# Patient Record
Sex: FEMALE | Race: WHITE | Employment: FULL TIME | URBAN - METROPOLITAN AREA
[De-identification: names, ages, dates, MRNs, and addresses within clinical notes are randomized per-mention and may not be internally consistent; named-entity substitution may affect disease eponyms.]

---

## 2017-01-01 ENCOUNTER — TRANSFERRED RECORDS (OUTPATIENT)
Dept: HEALTH INFORMATION MANAGEMENT | Facility: CLINIC | Age: 22
End: 2017-01-01

## 2017-01-01 LAB — PAP SMEAR - HIM PATIENT REPORTED: NEGATIVE

## 2018-12-13 ENCOUNTER — OFFICE VISIT (OUTPATIENT)
Dept: FAMILY MEDICINE | Facility: CLINIC | Age: 23
End: 2018-12-13
Payer: COMMERCIAL

## 2018-12-13 VITALS
WEIGHT: 191.4 LBS | OXYGEN SATURATION: 98 % | DIASTOLIC BLOOD PRESSURE: 82 MMHG | SYSTOLIC BLOOD PRESSURE: 122 MMHG | TEMPERATURE: 98.6 F | HEART RATE: 80 BPM | BODY MASS INDEX: 28.35 KG/M2 | HEIGHT: 69 IN

## 2018-12-13 DIAGNOSIS — S39.92XS BACK INJURY, SEQUELA: ICD-10-CM

## 2018-12-13 DIAGNOSIS — Z11.4 SCREENING FOR HIV (HUMAN IMMUNODEFICIENCY VIRUS): ICD-10-CM

## 2018-12-13 DIAGNOSIS — Z23 NEED FOR PROPHYLACTIC VACCINATION AND INOCULATION AGAINST INFLUENZA: ICD-10-CM

## 2018-12-13 DIAGNOSIS — Z30.011 ENCOUNTER FOR INITIAL PRESCRIPTION OF CONTRACEPTIVE PILLS: ICD-10-CM

## 2018-12-13 DIAGNOSIS — Z11.3 SCREEN FOR STD (SEXUALLY TRANSMITTED DISEASE): ICD-10-CM

## 2018-12-13 DIAGNOSIS — Z00.01 ENCOUNTER FOR ROUTINE ADULT MEDICAL EXAM WITH ABNORMAL FINDINGS: Primary | ICD-10-CM

## 2018-12-13 DIAGNOSIS — Z12.4 SCREENING FOR MALIGNANT NEOPLASM OF CERVIX: ICD-10-CM

## 2018-12-13 LAB
ERYTHROCYTE [DISTWIDTH] IN BLOOD BY AUTOMATED COUNT: 12.2 % (ref 10–15)
HCT VFR BLD AUTO: 39.3 % (ref 35–47)
HGB BLD-MCNC: 12.9 G/DL (ref 11.7–15.7)
HIV 1+2 AB+HIV1 P24 AG SERPL QL IA: NONREACTIVE
MCH RBC QN AUTO: 30.2 PG (ref 26.5–33)
MCHC RBC AUTO-ENTMCNC: 32.8 G/DL (ref 31.5–36.5)
MCV RBC AUTO: 92 FL (ref 78–100)
PLATELET # BLD AUTO: 315 10E9/L (ref 150–450)
RBC # BLD AUTO: 4.27 10E12/L (ref 3.8–5.2)
WBC # BLD AUTO: 7.1 10E9/L (ref 4–11)

## 2018-12-13 PROCEDURE — 87389 HIV-1 AG W/HIV-1&-2 AB AG IA: CPT | Performed by: FAMILY MEDICINE

## 2018-12-13 PROCEDURE — 87591 N.GONORRHOEAE DNA AMP PROB: CPT | Performed by: FAMILY MEDICINE

## 2018-12-13 PROCEDURE — 87491 CHLMYD TRACH DNA AMP PROBE: CPT | Performed by: FAMILY MEDICINE

## 2018-12-13 PROCEDURE — 80061 LIPID PANEL: CPT | Performed by: FAMILY MEDICINE

## 2018-12-13 PROCEDURE — 99385 PREV VISIT NEW AGE 18-39: CPT | Performed by: FAMILY MEDICINE

## 2018-12-13 PROCEDURE — 85027 COMPLETE CBC AUTOMATED: CPT | Performed by: FAMILY MEDICINE

## 2018-12-13 PROCEDURE — 36415 COLL VENOUS BLD VENIPUNCTURE: CPT | Performed by: FAMILY MEDICINE

## 2018-12-13 RX ORDER — CYCLOBENZAPRINE HCL 5 MG
5 TABLET ORAL
COMMUNITY
End: 2020-03-13

## 2018-12-13 RX ORDER — ETHYNODIOL DIACETATE AND ETHINYL ESTRADIOL 1 MG-35MCG
1 KIT ORAL DAILY
Qty: 28 TABLET | Refills: 12 | Status: SHIPPED | OUTPATIENT
Start: 2018-12-13 | End: 2019-04-01

## 2018-12-13 SDOH — HEALTH STABILITY: MENTAL HEALTH: HOW MANY STANDARD DRINKS CONTAINING ALCOHOL DO YOU HAVE ON A TYPICAL DAY?: 3 OR 4

## 2018-12-13 SDOH — HEALTH STABILITY: PHYSICAL HEALTH: ON AVERAGE, HOW MANY MINUTES DO YOU ENGAGE IN EXERCISE AT THIS LEVEL?: 30 MIN

## 2018-12-13 SDOH — HEALTH STABILITY: MENTAL HEALTH: HOW OFTEN DO YOU HAVE 6 OR MORE DRINKS ON ONE OCCASION?: NEVER

## 2018-12-13 SDOH — HEALTH STABILITY: MENTAL HEALTH: HOW OFTEN DO YOU HAVE A DRINK CONTAINING ALCOHOL?: 2-4 TIMES A MONTH

## 2018-12-13 SDOH — HEALTH STABILITY: PHYSICAL HEALTH: ON AVERAGE, HOW MANY DAYS PER WEEK DO YOU ENGAGE IN MODERATE TO STRENUOUS EXERCISE (LIKE A BRISK WALK)?: 5 DAYS

## 2018-12-13 ASSESSMENT — ANXIETY QUESTIONNAIRES
7. FEELING AFRAID AS IF SOMETHING AWFUL MIGHT HAPPEN: NOT AT ALL
6. BECOMING EASILY ANNOYED OR IRRITABLE: NOT AT ALL
IF YOU CHECKED OFF ANY PROBLEMS ON THIS QUESTIONNAIRE, HOW DIFFICULT HAVE THESE PROBLEMS MADE IT FOR YOU TO DO YOUR WORK, TAKE CARE OF THINGS AT HOME, OR GET ALONG WITH OTHER PEOPLE: NOT DIFFICULT AT ALL
1. FEELING NERVOUS, ANXIOUS, OR ON EDGE: NOT AT ALL
2. NOT BEING ABLE TO STOP OR CONTROL WORRYING: NOT AT ALL
GAD7 TOTAL SCORE: 0
3. WORRYING TOO MUCH ABOUT DIFFERENT THINGS: NOT AT ALL
5. BEING SO RESTLESS THAT IT IS HARD TO SIT STILL: NOT AT ALL

## 2018-12-13 ASSESSMENT — PATIENT HEALTH QUESTIONNAIRE - PHQ9
5. POOR APPETITE OR OVEREATING: NOT AT ALL
SUM OF ALL RESPONSES TO PHQ QUESTIONS 1-9: 0

## 2018-12-13 ASSESSMENT — MIFFLIN-ST. JEOR: SCORE: 1673.59

## 2018-12-13 NOTE — LETTER
[address] [address]  Phone: [phone]  Fax: [phone]  December 13, 2018     AUTHORIZATION TO RELEASE PROTECTED HEALTH INFORMATION    Patient Name:  Patricia Heredia  YOB: 1995    Benjamin MRN:[id number]             This will authorize [facility]  to request information from :     [facility]  F [phone]  P [phone]    The following information is to be released for health maintenance and continuing care purposes with my primary care clinic:                 Immunization Report                             Visit Notes     Lab results    -I understand that I may revoke this authorization by written request at any time to the address listed at the top of this form.  I understand that the revocation will not apply to information that has already been released in response to this authorization.    -This authorization last for one year after the date you sign it.     -[place] cannot prevent redisclosure of the information by the person or organization who receives your records under this authorization, and that information may not be covered by state and federal privacy protections after it is released. By signing this authorization, you release [place] from any and all liability resulting from a redisclosure by the recipient.    ___________________________________          _____________  Signature of Patient/Authorized Person                     Date        ____________________________________________  (Reason if patient is unable to sign)

## 2018-12-13 NOTE — LETTER
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           Sincerely,             Alva Orona MD

## 2018-12-13 NOTE — PROGRESS NOTES
SUBJECTIVE:   CC: Patricia Heredia is an 23 year old woman who presents for preventive health visit.     Patient transferring care to us at [place] from [facility] in [place], [place] from 2015-Feb 2018, last physical was November 2017.  Prior to 2015, she lived in [place] and was seen at [facility] in [place], [place] and her primary provider was Dr. Lucille Sexton. Patient requested records from [facility].  Faxed over a request for records from [facility] today.    She was prescribed a birth control pill that was prescribed by Dr. Lucille Sexton that really liked, she had been on it for a couple years.  She then transferred care to [facility] and was prescribed Tri-estarylla which made her really deleon and she had bad cramps. She is currently not on the on the pill but would like to be on one.      Healthy Habits:    Do you get at least three servings of calcium containing foods daily (dairy, green leafy vegetables, etc.)? yes    Amount of exercise or daily activities, outside of work: 3 day(s) per week    Problems taking medications regularly No    Medication side effects: No    Have you had an eye exam in the past two years? yes    Do you see a dentist twice per year? yes    Do you have sleep apnea, excessive snoring or daytime drowsiness?no      Today's PHQ-2 Score: No flowsheet data found.    Abuse: Current or Past(Physical, Sexual or Emotional)- No  Do you feel safe in your environment? Yes      Social History     Tobacco Use     Smoking status: Never Smoker     Smokeless tobacco: Never Used   Substance Use Topics     Alcohol use: Yes     Frequency: 2-4 times a month     Drinks per session: 3 or 4     Binge frequency: Never     Comment: 0-5 per week.      If you drink alcohol do you typically have >3 drinks per day or >7 drinks per week? No                     Reviewed orders with patient.  Reviewed health maintenance and updated orders accordingly - Yes  BP  Readings from Last 3 Encounters:   12/13/18 122/82    Wt Readings from Last 3 Encounters:   12/13/18 86.8 kg (191 lb 6.4 oz)                  Patient Active Problem List   Diagnosis     Contraceptive management     Past Surgical History:   Procedure Laterality Date     ADENOIDECTOMY Bilateral     in first grade - secondary to recurrent otitis media - went away after that     wisdom teeth         Social History     Tobacco Use     Smoking status: Never Smoker     Smokeless tobacco: Never Used   Substance Use Topics     Alcohol use: Yes     Frequency: 2-4 times a month     Drinks per session: 3 or 4     Binge frequency: Never     Comment: 0-5 per week.      Family History   Problem Relation Age of Onset     Diabetes Mother         type 2      Hyperlipidemia Mother      Hypertension Father      Parkinsonism Maternal Grandfather          Current Outpatient Medications   Medication Sig Dispense Refill     cyclobenzaprine (FLEXERIL) 5 MG tablet Take 5 mg by mouth nightly as needed for muscle spasms (Back spasms)        Allergies   Allergen Reactions     Seasonal Allergies      No lab results found.     Mammogram not appropriate for this patient based on age.    No results found.      History of abnormal Pap smear: NO - age 21-29 PAP every 3 years recommended        Reviewed and updated as needed this visit by clinical staff  Tobacco  Allergies  Meds  Med Hx  Surg Hx  Fam Hx  Soc Hx      Reviewed and updated as needed this visit by Provider  Tobacco  Med Hx  Fam Hx  Soc Hx       Past Medical History:   Diagnosis Date     Back injury, sequela     from diving in volleyball during HS - better with 3x/week stretching with boxing      Contraceptive management     on tri-estaryla - got bad mood swings/anger issues.       Past Surgical History:   Procedure Laterality Date     ADENOIDECTOMY Bilateral     in first grade - secondary to recurrent otitis media - went away after that     wisdom teeth       Obstetric History     "   T0      L0     SAB0   TAB0   Ectopic0   Multiple0   Live Births0           ROS:  CONSTITUTIONAL: NEGATIVE for fever, chills, change in weight  INTEGUMENTARU/SKIN: NEGATIVE for worrisome rashes, moles or lesions  EYES: NEGATIVE for vision changes or irritation  ENT: NEGATIVE for ear, mouth and throat problems  RESP: NEGATIVE for significant cough or SOB  BREAST: NEGATIVE for masses, tenderness or discharge  CV: NEGATIVE for chest pain, palpitations or peripheral edema  GI: NEGATIVE for nausea, abdominal pain, heartburn, or change in bowel habits  : NEGATIVE for unusual urinary or vaginal symptoms. Periods are regular.  MUSCULOSKELETAL: NEGATIVE for significant arthralgias or myalgia  NEURO: NEGATIVE for weakness, dizziness or paresthesias  ENDOCRINE: NEGATIVE for temperature intolerance, skin/hair changes  HEME/ALLERGY/IMMUNE: NEGATIVE for bleeding problems  PSYCHIATRIC: NEGATIVE for changes in mood or affect    OBJECTIVE:   /82 (BP Location: Right arm, Patient Position: Chair, Cuff Size: Adult Regular)   Pulse 80   Temp 98.6  F (37  C) (Oral)   Ht 1.746 m (5' 8.75\")   Wt 86.8 kg (191 lb 6.4 oz)   LMP 2018 (Exact Date)   SpO2 98%   Breastfeeding? No   BMI 28.47 kg/m    EXAM:  GENERAL: healthy, alert and no distress  EYES: Eyes grossly normal to inspection, PERRL and conjunctivae and sclerae normal  HENT: ear canals and TM's normal, nose and mouth without ulcers or lesions  NECK: no adenopathy, no asymmetry, masses, or scars and thyroid normal to palpation  RESP: lungs clear to auscultation - no rales, rhonchi or wheezes  BREAST: normal without masses, tenderness or nipple discharge and no palpable axillary masses or adenopathy  CV: regular rate and rhythm, normal S1 S2, no S3 or S4, no murmur, click or rub, no peripheral edema and peripheral pulses strong  ABDOMEN: soft, nontender, no hepatosplenomegaly, no masses and bowel sounds normal   (female): normal female external " "genitalia, normal urethral meatus, vaginal mucosa pink, moist, well rugated, and normal cervix/adnexa/uterus without masses or discharge  MS: no gross musculoskeletal defects noted, no edema  SKIN: no suspicious lesions or rashes  NEURO: Normal strength and tone, mentation intact and speech normal  PSYCH: mentation appears normal, affect normal/bright    Diagnostic Test Results:  See [facility] orders.     ASSESSMENT/PLAN:       ICD-10-CM    1. Encounter for routine adult medical exam with abnormal findings Z00.01 Lipid panel reflex to direct LDL Non-fasting     CBC with platelets   2. Screening for malignant neoplasm of cervix Z12.4    3. Screening for HIV (human immunodeficiency virus) Z11.4 HIV Screening   4. Need for prophylactic vaccination and inoculation against influenza Z23    5. Encounter for initial prescription of contraceptive pills Z30.011 ethynodiol-ethinyl estradiol (KELNOR) 1-35 MG-MCG tablet   6. Back injury, sequela S39.92XS      See letters section for stand up/sit down desk.     COUNSELING:   Reviewed preventive health counseling, as reflected in patient instructions    BP Readings from Last 1 Encounters:   12/13/18 122/82     Estimated body mass index is 28.47 kg/m  as calculated from the following:    Height as of this encounter: 1.746 m (5' 8.75\").    Weight as of this encounter: 86.8 kg (191 lb 6.4 oz).      Weight management plan: Discussed healthy diet and exercise guidelines   Established an exercise regimen with the patient. Activity goal: 45 minutes 5 days a week. New exercise routine: cardiovascular workout on exercise equipment, walking and weightlifting. Diet regimen was discussed and plan is self-directed dieting: reduce calories, reduce portions, reduce processed  carbs, increase fruits/vegetables and avoid sweets and supervised diet program. Avoid artificial sweeteners and \"diet\" drinks and sodas.       reports that  has never smoked. she has never used smokeless " tobacco.      Counseling Resources:  ATP IV Guidelines  Pooled Cohorts Equation Calculator  Breast Cancer Risk Calculator  FRAX Risk Assessment  ICSI Preventive Guidelines  Dietary Guidelines for Americans, 2010  USDA's MyPlate  ASA Prophylaxis  Lung CA Screening    Alva Orona MD  [facility]

## 2018-12-13 NOTE — PATIENT INSTRUCTIONS
Preventive Health Recommendations  Female Ages 21 to 25     Yearly exam:     See your health care provider every year in order to  o Review health changes.   o Discuss preventive care.    o Review your medicines if your doctor has prescribed any.      You should be tested each year for STDs (sexually transmitted diseases).       Talk to your provider about how often you should have cholesterol testing.      Get a Pap test every three years. If you have an abnormal result, your doctor may have you test more often.      If you are at risk for diabetes, you should have a diabetes test (fasting glucose).     Shots:     Get a flu shot each year.     Get a tetanus shot every 10 years.     Consider getting the shot (vaccine) that prevents cervical cancer (Gardasil).    Nutrition:     Eat at least 5 servings of fruits and vegetables each day.    Eat whole-grain bread, whole-wheat pasta and brown rice instead of white grains and rice.    Get adequate Calcium and Vitamin D.     Lifestyle    Exercise at least 150 minutes a week each week (30 minutes a day, 5 days a week). This will help you control your weight and prevent disease.    Limit alcohol to one drink per day.    No smoking.     Wear sunscreen to prevent skin cancer.    See your dentist every six months for an exam and cleaning.               Thank you for choosing [facility]  for your Health Care. It was a pleasure seeing you at your visit today. Please contact us with any questions or concerns you may have.                   Alva Orona MD                                  To reach your [facility] care team after hours call:   [phone]    Our clinic hours are:     Monday- 7:30 am - 7:00 pm                             Tuesday through Friday- 7:30 am - 5:00 pm                                        Saturday- 8:00 am - 12:00 pm                  Phone:  [phone]    Our pharmacy hours are:     Monday  8:00 am to  7:00 pm      Tuesday through Friday 8:00am to 6:00pm                        Saturday - 9:00 am to 1:00 pm      Sunday : Closed.              Phone:  [phone]      There is also information available at our web site:  [email or web address]    If your provider ordered any lab tests and you do not receive the results within 10 business days, please call the clinic.    If you need a medication refill please contact your pharmacy.  Please allow 2 business days for your refill to be completed.    Our clinic offers telephone visits and e visits.  Please ask one of your team members to explain more.      Use [employer] (secure email communication and access to your chart) to send your primary care provider a message or make an appointment. Ask someone on your Team how to sign up for [employer].

## 2018-12-13 NOTE — Clinical Note
Please abstract the following data from this visit with this patient into the appropriate field in Epic:Pap smear done on this date: 2017 (approximately), by this group: [facility], results were normal.

## 2018-12-13 NOTE — LETTER
[facility]  [address] [place] [address]                  [phone]   December 17, 2018    Patricia Heredia  [address] [facility] [address]      Dear Patricia,    Here is a summary of your recent test results:    -All of your labs are normal.   HIV screening test done per current CDC guidelines = negative.    lipids, CBC with hemoglobin, white blood cells , and platelets all normal     Gonorrhea and chlamydia testing was negative.     Your test results are enclosed.      Please contact me if you have any questions.             Thank you very much for trusting [facility]..     Healthy regards,       Alva Orona M.D.          Results for orders placed or performed in visit on 12/13/18   HIV Screening   Result Value Ref Range    HIV Antigen Antibody Combo Nonreactive NR^Nonreactive       Lipid panel reflex to direct LDL Non-fasting   Result Value Ref Range    Cholesterol 160 <200 mg/dL    Triglycerides 53 <150 mg/dL    HDL Cholesterol 62 >49 mg/dL    LDL Cholesterol Calculated 87 <100 mg/dL    Non HDL Cholesterol 98 <130 mg/dL   CBC with platelets   Result Value Ref Range    WBC 7.1 4.0 - 11.0 10e9/L    RBC Count 4.27 3.8 - 5.2 10e12/L    Hemoglobin 12.9 11.7 - 15.7 g/dL    Hematocrit 39.3 35.0 - 47.0 %    MCV 92 78 - 100 fl    MCH 30.2 26.5 - 33.0 pg    MCHC 32.8 31.5 - 36.5 g/dL    RDW 12.2 10.0 - 15.0 %    Platelet Count 315 150 - 450 10e9/L   NEISSERIA GONORRHOEA PCR   Result Value Ref Range    Specimen Descrip Cervical     N Gonorrhea PCR Negative NEG^Negative   CHLAMYDIA TRACHOMATIS PCR   Result Value Ref Range    Specimen Description Cervical     Chlamydia Trachomatis PCR Negative NEG^Negative

## 2018-12-14 LAB
C TRACH DNA SPEC QL NAA+PROBE: NEGATIVE
CHOLEST SERPL-MCNC: 160 MG/DL
HDLC SERPL-MCNC: 62 MG/DL
LDLC SERPL CALC-MCNC: 87 MG/DL
N GONORRHOEA DNA SPEC QL NAA+PROBE: NEGATIVE
NONHDLC SERPL-MCNC: 98 MG/DL
SPECIMEN SOURCE: NORMAL
SPECIMEN SOURCE: NORMAL
TRIGL SERPL-MCNC: 53 MG/DL

## 2018-12-14 ASSESSMENT — ANXIETY QUESTIONNAIRES: GAD7 TOTAL SCORE: 0

## 2019-04-01 DIAGNOSIS — Z30.011 ENCOUNTER FOR INITIAL PRESCRIPTION OF CONTRACEPTIVE PILLS: ICD-10-CM

## 2019-04-01 RX ORDER — ETHYNODIOL DIACETATE AND ETHINYL ESTRADIOL 1 MG-35MCG
1 KIT ORAL DAILY
Qty: 28 TABLET | Refills: 8 | Status: SHIPPED | OUTPATIENT
Start: 2019-04-01 | End: 2020-03-13

## 2019-04-01 NOTE — TELEPHONE ENCOUNTER
"Requested Prescriptions   Pending Prescriptions Disp Refills     ethynodiol-ethinyl estradiol (KELNOR) 1-35 MG-MCG tablet 28 tablet 12        Last Written Prescription Date:  12/13/2018  Last Fill Quantity: 28,  # refills: 12   Last office visit: 12/13/2018 with prescribing provider:     Future Office Visit:   Next 5 appointments (look out 90 days)    Apr 08, 2019  9:30 AM CDT  Nurse Only with RV MA/LPN  [facility] [address] [place] [address]  [phone]   Apr 10, 2019  9:00 AM CDT  Nurse Only with RV ANTICOAGULATION CLINIC  [facility]) [address] [place] [id number]  [phone]            Sig: Take 1 tablet by mouth daily    Contraceptives Protocol Passed - 4/1/2019 10:57 AM       Passed - Patient is not a current smoker if age is 35 or older       Passed - Recent (12 mo) or future (30 days) visit within the authorizing provider's specialty    Patient had office visit in the last 12 months or has a visit in the next 30 days with authorizing provider or within the authorizing provider's specialty.  See \"Patient Info\" tab in inbasket, or \"Choose Columns\" in Meds & Orders section of the refill encounter.             Passed - Medication is active on med list       Passed - No active pregnancy on record       Passed - No positive pregnancy test in past 12 months        "

## 2019-04-01 NOTE — TELEPHONE ENCOUNTER
Reason for Call:  Medication or medication refill:    Do you use a [facility]?  Name of the pharmacy and phone number for the current request[facility]   Ph: [phone]  Fax: [phone]    Name of the medication requested: ethynodiol-ethinyl estradiol (KELNOR) 1-35 MG-MCG tablet    Other request: Advised of 72 business hour policy.    Can we leave a detailed message on this number? YES    Phone number patient can be reached at: Cell number on file:    Telephone Information:   Mobile [phone]       Best Time: anytime    Call taken on 4/1/2019 at 10:41 AM by Jaimie ASKEW

## 2019-04-01 NOTE — TELEPHONE ENCOUNTER
Prescription approved per [facility] Refill Protocol.  Pharmacy change.    Ines Amador, BS, RN, N  [facility]) [phone]

## 2019-10-09 ENCOUNTER — TRANSFERRED RECORDS (OUTPATIENT)
Dept: HEALTH INFORMATION MANAGEMENT | Facility: CLINIC | Age: 24
End: 2019-10-09

## 2020-01-22 ENCOUNTER — ANCILLARY PROCEDURE (OUTPATIENT)
Dept: GENERAL RADIOLOGY | Facility: CLINIC | Age: 25
End: 2020-01-22
Attending: PHYSICIAN ASSISTANT
Payer: COMMERCIAL

## 2020-01-22 ENCOUNTER — OFFICE VISIT (OUTPATIENT)
Dept: URGENT CARE | Facility: URGENT CARE | Age: 25
End: 2020-01-22
Payer: COMMERCIAL

## 2020-01-22 VITALS
DIASTOLIC BLOOD PRESSURE: 64 MMHG | WEIGHT: 195 LBS | HEART RATE: 78 BPM | HEIGHT: 68 IN | OXYGEN SATURATION: 99 % | TEMPERATURE: 98.1 F | BODY MASS INDEX: 29.55 KG/M2 | SYSTOLIC BLOOD PRESSURE: 116 MMHG | RESPIRATION RATE: 14 BRPM

## 2020-01-22 DIAGNOSIS — S99.911A ANKLE INJURY, RIGHT, INITIAL ENCOUNTER: ICD-10-CM

## 2020-01-22 DIAGNOSIS — S86.011A STRAIN OF RIGHT ACHILLES TENDON, INITIAL ENCOUNTER: Primary | ICD-10-CM

## 2020-01-22 PROCEDURE — 99214 OFFICE O/P EST MOD 30 MIN: CPT | Performed by: PHYSICIAN ASSISTANT

## 2020-01-22 PROCEDURE — 73610 X-RAY EXAM OF ANKLE: CPT | Mod: RT

## 2020-01-22 RX ORDER — CYCLOBENZAPRINE HCL 5 MG
5 TABLET ORAL
COMMUNITY
Start: 2018-02-23 | End: 2020-03-13

## 2020-01-22 RX ORDER — NORGESTIMATE AND ETHINYL ESTRADIOL 7DAYSX3 28
1 KIT ORAL
COMMUNITY
Start: 2017-11-08 | End: 2021-02-17

## 2020-01-22 RX ORDER — IBUPROFEN 200 MG
600 TABLET ORAL EVERY 4 HOURS PRN
COMMUNITY
End: 2021-02-17

## 2020-01-22 ASSESSMENT — MIFFLIN-ST. JEOR: SCORE: 1673.01

## 2020-01-22 ASSESSMENT — PAIN SCALES - GENERAL: PAINLEVEL: MODERATE PAIN (5)

## 2020-01-22 ASSESSMENT — ENCOUNTER SYMPTOMS: WOUND: 0

## 2020-01-22 NOTE — PATIENT INSTRUCTIONS
"  Patient Education     Achilles Tendon injury  Your Achilles tendon is a large band of tissue in the back of your ankle. It connects your calf muscles to your heel bone. The tendon helps you point your foot downward, rise on your toes, and push off when you walk. You use it almost every time you move your leg. But repeated stress can make the tendon more prone to injury. It may become inflamed and develop small tears (tendonitis). A complete tear through the tendon is known as an Achilles tendon rupture.    When to go to the emergency room (ER)  Seek medical care right away if you:    Hear a loud \"pop\" or snapping sound    Have the sensation of being hit hard in the back of the leg    Aren`t able to stand on your toes, bend your foot downward, or walk normally  A ruptured Achilles tendon may NOT cause pain. If you hear a loud pop, even if you can walk and don`t have pain, seek medical attention.  What to expect in the ER  A healthcare provider will examine your feet, ankles, and legs. He or she is likely to ask about your physical activities. Your healthcare provider may order tests, which could include a magnetic resonance imaging test (MRI). This test uses magnets to create a clear picture of the tendons. An ultrasound may also be done. Ultrasound uses sound waves to create an image of the injured area.  Your treatment  If your tendon has ruptured, you will be referred to an orthopaedic surgeon or a primary care sports healthcare provider. He or she will discuss your treatment choices with you. These include surgery to repair the tendon or wearing a cast or boot and using crutches until the tendon heals.  Date Last Reviewed: 5/1/2018 2000-2019 [employer]. [address] [place] [address], [place] [address]. All rights reserved. This information is not intended as a substitute for professional medical care. Always follow your healthcare professional's instructions.           "

## 2020-01-22 NOTE — PROGRESS NOTES
SUBJECTIVE:   Patricia Heredia is a 24 year old female presenting with a chief complaint of   Chief Complaint   Patient presents with     Urgent Care     Ankle Trauma     Rolled right ankle while playing volleyball, pain is in back of ankle/leg, no swelling in ankle besides the back, can not put weight on it x last pm       She is an established patient of [facility].    MS Injury/Pain    Onset of symptoms was 1 day(s) ago.  Location: right posterior ankle  Context:       The injury happened while playing volleyball      Mechanism: sports related injury      Patient experienced immediate pain  Course of symptoms is same.    Severity moderate  Current and Associated symptoms: Pain and Swelling, decreased range of motion  Denies  Warmth and Redness  Aggravating Factors: walking and weight-bearing  Therapies to improve symptoms include: ice and ibuprofen  This is the first time this type of problem has occurred for this patient.       Review of Systems   Musculoskeletal:        Right ankle pain   Skin: Negative for wound.       Past Medical History:   Diagnosis Date     Back injury, sequela     from diving in volleyball during HS - better with 3x/week stretching with boxing      Contraceptive management     on tri-estaryla - got bad mood swings/anger issues.      Family History   Problem Relation Age of Onset     Diabetes Mother         type 2      Hyperlipidemia Mother      Hypertension Father      Parkinsonism Maternal Grandfather      Current Outpatient Medications   Medication Sig Dispense Refill     ethynodiol-ethinyl estradiol (KELNOR) 1-35 MG-MCG tablet Take 1 tablet by mouth daily 28 tablet 8     ibuprofen (ADVIL/MOTRIN) 200 MG tablet Take 600 mg by mouth every 4 hours as needed for mild pain       order for DME Walking boot 1 Device 0     cyclobenzaprine (FLEXERIL) 5 MG tablet Take 5 mg by mouth       cyclobenzaprine (FLEXERIL) 5 MG tablet Take 5 mg by mouth nightly as needed for muscle spasms (Back spasms)         "norgestim-eth estrad triphasic (TRI-ESTARYLLA) 0.18/0.215/0.25 MG-35 MCG tablet Take 1 tablet by mouth       Social History     Tobacco Use     Smoking status: Never Smoker     Smokeless tobacco: Never Used   Substance Use Topics     Alcohol use: Yes     Frequency: 2-4 times a month     Drinks per session: 3 or 4     Binge frequency: Never     Comment: 0-5 per week.        OBJECTIVE  /64 (BP Location: Right arm, Patient Position: Sitting, Cuff Size: Adult Regular)   Pulse 78   Temp 98.1  F (36.7  C) (Oral)   Resp 14   Ht 1.727 m (5' 8\")   Wt 88.5 kg (195 lb)   LMP  (LMP Unknown)   SpO2 99%   Breastfeeding No   BMI 29.65 kg/m      Physical Exam  Constitutional:       General: She is not in acute distress.     Appearance: She is well-developed.   HENT:      Head: Normocephalic and atraumatic.      Right Ear: External ear normal.      Left Ear: External ear normal.   Eyes:      Conjunctiva/sclera: Conjunctivae normal.   Neck:      Musculoskeletal: Normal range of motion.   Pulmonary:      Effort: Pulmonary effort is normal. No respiratory distress.   Musculoskeletal:         General: Swelling and tenderness present.      Comments: Right ankle exam: tenderness to palpation right achilles tendon, mild to moderate swelling noted. ROM is limited due to pain. Plantar flexion and dorsiflexion mildly limited due to pain, but patient still able to perform.   Skin:     General: Skin is warm and dry.   Neurological:      Mental Status: She is alert.         Labs:  No results found for this or any previous visit (from the past 24 hour(s)).    X-Ray was done, my findings are: Negative for acute fractures or dislocation    ASSESSMENT:      ICD-10-CM    1. Strain of right Achilles tendon, initial encounter S86.011A order for DME   2. Ankle injury, right, initial encounter S99.911A XR Ankle Right G/E 3 Views     order for DME          PLAN:    Right ankle injury: X-ray today negative for acute fracture or dislocation. " "Discussed Achilles tendon strain. No evidence of complete Achilles' tendon rupture on exam.  Patient is provided with a walking boot.  Recommended weightbearing as tolerated.  Will anticipate gradual improvement in symptoms in the next 4 to 6 weeks.  Advised can take Tylenol or Motrin as needed for pain.  Follow-up if any worsening symptoms.  Patient agrees with the plan.    Followup:    If not improving or if condition worsens, follow up with your Primary Care Provider    Patient Instructions     Patient Education     Achilles Tendon injury  Your Achilles tendon is a large band of tissue in the back of your ankle. It connects your calf muscles to your heel bone. The tendon helps you point your foot downward, rise on your toes, and push off when you walk. You use it almost every time you move your leg. But repeated stress can make the tendon more prone to injury. It may become inflamed and develop small tears (tendonitis). A complete tear through the tendon is known as an Achilles tendon rupture.    When to go to the emergency room (ER)  Seek medical care right away if you:    Hear a loud \"pop\" or snapping sound    Have the sensation of being hit hard in the back of the leg    Aren`t able to stand on your toes, bend your foot downward, or walk normally  A ruptured Achilles tendon may NOT cause pain. If you hear a loud pop, even if you can walk and don`t have pain, seek medical attention.  What to expect in the ER  A healthcare provider will examine your feet, ankles, and legs. He or she is likely to ask about your physical activities. Your healthcare provider may order tests, which could include a magnetic resonance imaging test (MRI). This test uses magnets to create a clear picture of the tendons. An ultrasound may also be done. Ultrasound uses sound waves to create an image of the injured area.  Your treatment  If your tendon has ruptured, you will be referred to an orthopaedic surgeon or a primary care sports " healthcare provider. He or she will discuss your treatment choices with you. These include surgery to repair the tendon or wearing a cast or boot and using crutches until the tendon heals.  Date Last Reviewed: 5/1/2018 2000-2019 [employer]. [address] [place] [address], [place] [address]. All rights reserved. This information is not intended as a substitute for professional medical care. Always follow your healthcare professional's instructions.

## 2020-03-11 ENCOUNTER — HEALTH MAINTENANCE LETTER (OUTPATIENT)
Age: 25
End: 2020-03-11

## 2020-03-13 ENCOUNTER — OFFICE VISIT (OUTPATIENT)
Dept: FAMILY MEDICINE | Facility: CLINIC | Age: 25
End: 2020-03-13
Payer: COMMERCIAL

## 2020-03-13 VITALS
BODY MASS INDEX: 29.1 KG/M2 | WEIGHT: 192 LBS | OXYGEN SATURATION: 98 % | HEIGHT: 68 IN | HEART RATE: 115 BPM | DIASTOLIC BLOOD PRESSURE: 78 MMHG | SYSTOLIC BLOOD PRESSURE: 100 MMHG | TEMPERATURE: 98.4 F

## 2020-03-13 DIAGNOSIS — M76.61 ACHILLES TENDINITIS OF RIGHT LOWER EXTREMITY: Primary | ICD-10-CM

## 2020-03-13 PROCEDURE — 99213 OFFICE O/P EST LOW 20 MIN: CPT | Performed by: FAMILY MEDICINE

## 2020-03-13 ASSESSMENT — MIFFLIN-ST. JEOR: SCORE: 1659.41

## 2020-03-13 NOTE — PROGRESS NOTES
"Subjective   Patricia Heredia is a 24 year old female who presents to clinic today for the following health issues:    HPI   Right Ankle Injury  On January 21st I injured my right ankle playing volleyball, the following day I went to Urgent Care to have it looked at. I was told I severely strained my achilles tendon and was placed in a walking boot for 4-6 weeks with no real   follow up instructions. At week 4 I tweaked it again. I'm still in my boot but want to get a plan in place.    Tender to the touch- still swollen.    She has been in a boot for the last 7 weeks. No previous Achilles tendon injuries. She went to run and felt a \"punch in the back of the leg\".     Reviewed and updated as needed this visit by provider:  Tobacco  Allergies  Meds  Problems  Med Hx  Surg Hx  Fam Hx         Review of Systems   Constitutional, HEENT, cardiovascular, pulmonary, GI, , musculoskeletal, neuro, skin, endocrine and psych systems are negative, except as otherwise noted.    This document serves as a record of the services and decisions personally performed and made by Kristian Ceron MD. It was created on his behalf by Per Marks, a trained medical scribe. The creation of this document is based the provider's statements to the medical scribe.  Scribe Per Marks 11:21 AM, March 13, 2020        Objective   /78   Pulse 115   Temp 98.4  F (36.9  C) (Oral)   Ht 1.727 m (5' 8\")   Wt 87.1 kg (192 lb)   SpO2 98%   BMI 29.19 kg/m   Body mass index is 29.19 kg/m .  Physical Exam   GENERAL: healthy, alert, well nourished, well hydrated, no distress  EYES: Eyes grossly normal to inspection, extraocular movements - intact, and PERRL  MS: tenderness in the right Achilles tendon, warmth in the area noted, edema of right ankle, otherwise, extremities- no gross deformities noted, no edema  NEURO: strength and tone- normal, sensory exam- grossly normal, mentation- intact, speech- normal, reflexes- symmetric  PSYCH: Alert " "and oriented times 3; speech- coherent , normal rate and volume; able to articulate logical thoughts, able to abstract reason, no tangential thoughts, no hallucinations or delusions, affect- normal        Assessment & Plan   Patricia was seen today for musculoskeletal problem.    Diagnoses and all orders for this visit:    Achilles tendinitis of right lower extremity - About 7 week onset with present edema and tenderness. Recommend cross massage, ice massage, heat, stretching, and eccentric exercises. Formal physical therapy referral given today. Patient told she can wear a normal shoe.   -     [facility] RE[facility]ERR[facility]; Future    BMI:   Estimated body mass index is 29.65 kg/m  as calculated from the following:    Height as of 1/22/20: 1.727 m (5' 8\").    Weight as of 1/22/20: 88.5 kg (195 lb).   Weight management plan: Discussed healthy diet and exercise guidelines    See Patient Instructions    Return in about 1 month (around 4/13/2020), or if symptoms worsen or fail to improve, for Recheck.    The information in this document, created by the medical scribe for me, accurately reflects the services I personally performed and the decisions made by me. I have reviewed and approved this document for accuracy prior to leaving the patient care area.  11:35 AM, 03/13/20        Young Ceron MD      [address] [address]  [email or web address]   Office: [phone]  Pager: [phone]         "

## 2021-01-03 ENCOUNTER — HEALTH MAINTENANCE LETTER (OUTPATIENT)
Age: 26
End: 2021-01-03

## 2021-02-17 ENCOUNTER — OFFICE VISIT (OUTPATIENT)
Dept: FAMILY MEDICINE | Facility: CLINIC | Age: 26
End: 2021-02-17
Payer: COMMERCIAL

## 2021-02-17 VITALS
DIASTOLIC BLOOD PRESSURE: 76 MMHG | WEIGHT: 200 LBS | OXYGEN SATURATION: 97 % | TEMPERATURE: 97.9 F | HEIGHT: 69 IN | SYSTOLIC BLOOD PRESSURE: 118 MMHG | HEART RATE: 107 BPM | BODY MASS INDEX: 29.62 KG/M2

## 2021-02-17 DIAGNOSIS — Z13.0 SCREENING FOR DEFICIENCY ANEMIA: ICD-10-CM

## 2021-02-17 DIAGNOSIS — Z30.09 BIRTH CONTROL COUNSELING: ICD-10-CM

## 2021-02-17 DIAGNOSIS — Z12.4 SCREENING FOR MALIGNANT NEOPLASM OF CERVIX: ICD-10-CM

## 2021-02-17 DIAGNOSIS — Z00.00 ROUTINE GENERAL MEDICAL EXAMINATION AT A HEALTH CARE FACILITY: Primary | ICD-10-CM

## 2021-02-17 DIAGNOSIS — Z13.29 SCREENING FOR THYROID DISORDER: ICD-10-CM

## 2021-02-17 DIAGNOSIS — Z11.59 NEED FOR HEPATITIS C SCREENING TEST: ICD-10-CM

## 2021-02-17 LAB
ERYTHROCYTE [DISTWIDTH] IN BLOOD BY AUTOMATED COUNT: 11.9 % (ref 10–15)
HCT VFR BLD AUTO: 37.9 % (ref 35–47)
HGB BLD-MCNC: 12.9 G/DL (ref 11.7–15.7)
MCH RBC QN AUTO: 30.9 PG (ref 26.5–33)
MCHC RBC AUTO-ENTMCNC: 34 G/DL (ref 31.5–36.5)
MCV RBC AUTO: 91 FL (ref 78–100)
PLATELET # BLD AUTO: 339 10E9/L (ref 150–450)
RBC # BLD AUTO: 4.17 10E12/L (ref 3.8–5.2)
WBC # BLD AUTO: 6 10E9/L (ref 4–11)

## 2021-02-17 PROCEDURE — 90471 IMMUNIZATION ADMIN: CPT | Performed by: NURSE PRACTITIONER

## 2021-02-17 PROCEDURE — 86803 HEPATITIS C AB TEST: CPT | Performed by: NURSE PRACTITIONER

## 2021-02-17 PROCEDURE — 36415 COLL VENOUS BLD VENIPUNCTURE: CPT | Performed by: NURSE PRACTITIONER

## 2021-02-17 PROCEDURE — 90686 IIV4 VACC NO PRSV 0.5 ML IM: CPT | Performed by: NURSE PRACTITIONER

## 2021-02-17 PROCEDURE — 90472 IMMUNIZATION ADMIN EACH ADD: CPT | Performed by: NURSE PRACTITIONER

## 2021-02-17 PROCEDURE — 84443 ASSAY THYROID STIM HORMONE: CPT | Performed by: NURSE PRACTITIONER

## 2021-02-17 PROCEDURE — 90715 TDAP VACCINE 7 YRS/> IM: CPT | Performed by: NURSE PRACTITIONER

## 2021-02-17 PROCEDURE — 80048 BASIC METABOLIC PNL TOTAL CA: CPT | Performed by: NURSE PRACTITIONER

## 2021-02-17 PROCEDURE — 99395 PREV VISIT EST AGE 18-39: CPT | Mod: 25 | Performed by: NURSE PRACTITIONER

## 2021-02-17 PROCEDURE — G0145 SCR C/V CYTO,THINLAYER,RESCR: HCPCS | Performed by: NURSE PRACTITIONER

## 2021-02-17 PROCEDURE — 85027 COMPLETE CBC AUTOMATED: CPT | Performed by: NURSE PRACTITIONER

## 2021-02-17 ASSESSMENT — MIFFLIN-ST. JEOR: SCORE: 1698.63

## 2021-02-17 NOTE — PROGRESS NOTES
SUBJECTIVE:   CC: Patricia Heredia is an 25 year old woman who presents for preventive health visit.   Patient has been advised of split billing requirements and indicates understanding: Yes  Healthy Habits:     Getting at least 3 servings of Calcium per day:  Yes    Bi-annual eye exam:  Yes    Dental care twice a year:  Yes    Sleep apnea or symptoms of sleep apnea:  None    Diet:  Regular (no restrictions)    Frequency of exercise:  4-5 days/week    Duration of exercise:  30-45 minutes    Taking medications regularly:  Yes    Medication side effects:  None    PHQ-2 Total Score: 0    Additional concerns today:  No    Discuss Birth Control -  Pill is not working great for her forgets to take- is interested in the non-hormonal IUD.  Normal menses, cramps but taking ibuprofen relieves them.    Oral BCP many different pills.   1 year no BCP. NO concern for std or pregnancy. LMP 2/9/21.    Engaged plans to be  in June in [place].     Today's PHQ-2 Score: 0-0  PHQ-2 ( 1999 [employer]) 2/16/2021   Q1: Little interest or pleasure in doing things 0   Q2: Feeling down, depressed or hopeless 0   PHQ-2 Score 0   Q1: Little interest or pleasure in doing things Not at all   Q2: Feeling down, depressed or hopeless Not at all   PHQ-2 Score 0     Abuse: Current or Past (Physical, Sexual or Emotional) - No  Do you feel safe in your environment? Yes    Have you ever done Advance Care Planning? (For example, a Health Directive, POLST, or a discussion with a medical provider or your loved ones about your wishes): No, advance care planning information given to patient to review.  Advanced care planning was discussed at today's visit.    Social History     Tobacco Use     Smoking status: Never Smoker     Smokeless tobacco: Never Used   Substance Use Topics     Alcohol use: Yes     Frequency: 2-4 times a month     Drinks per session: 3 or 4     Binge frequency: Never     Comment: 0-5 per week.      If you drink alcohol do you typically  have >3 drinks per day or >7 drinks per week? No    Alcohol Use 2/17/2021   Prescreen: >3 drinks/day or >7 drinks/week? -   Prescreen: >3 drinks/day or >7 drinks/week? No         Reviewed orders with patient.  Reviewed health maintenance and updated orders accordingly - Yes  Lab work is in process  Labs reviewed in EPIC  BP Readings from Last 3 Encounters:   02/17/21 118/76   03/13/20 100/78   01/22/20 116/64    Wt Readings from Last 3 Encounters:   02/17/21 90.7 kg (200 lb)   03/13/20 87.1 kg (192 lb)   01/22/20 88.5 kg (195 lb)                  Patient Active Problem List   Diagnosis     Contraceptive management     Back injury, sequela     Achilles tendinitis of right lower extremity     Past Surgical History:   Procedure Laterality Date     ADENOIDECTOMY Bilateral     in first grade - secondary to recurrent otitis media - went away after that     wisdom teeth         Social History     Tobacco Use     Smoking status: Never Smoker     Smokeless tobacco: Never Used   Substance Use Topics     Alcohol use: Yes     Frequency: 2-4 times a month     Drinks per session: 3 or 4     Binge frequency: Never     Comment: 0-5 per week.      Family History   Problem Relation Age of Onset     Diabetes Mother         type 2      Hyperlipidemia Mother      Hypertension Father      Parkinsonism Maternal Grandfather          No current outpatient medications on file.     Allergies   Allergen Reactions     Seasonal Allergies        Breast CA Risk Screening:  NO risks    NO mammogram due to age.    History of abnormal Pap smear: NO - age 21-29 PAP every 3 years recommended     Reviewed and updated as needed this visit by clinical staff  Tobacco  Allergies  Meds  Problems  Med Hx  Surg Hx  Fam Hx  Soc Hx          Reviewed and updated as needed this visit by Provider  Tobacco  Allergies  Meds  Problems  Med Hx  Surg Hx  Fam Hx           Review of Systems  Constitutional, HEENT, cardiovascular, pulmonary, GI, ,  "musculoskeletal, neuro, skin, endocrine and psych systems are negative, except as otherwise noted in the HPI.    OBJECTIVE:   /76 (BP Location: Right arm, Patient Position: Chair, Cuff Size: Adult Large)   Pulse 107   Temp 97.9  F (36.6  C) (Tympanic)   Ht 1.74 m (5' 8.5\")   Wt 90.7 kg (200 lb)   LMP 02/09/2021 (Exact Date)   SpO2 97%   Breastfeeding No   BMI 29.97 kg/m    Physical Exam  GENERAL: healthy, alert and no distress  EYES: Eyes grossly normal to inspection, PERRL and conjunctivae and sclerae normal  HENT: ear canals and TM's normal, nose and mouth without ulcers or lesions  NECK: no adenopathy, no asymmetry, masses, or scars and thyroid normal to palpation  RESP: lungs clear to auscultation - no rales, rhonchi or wheezes  BREAST: normal without masses, tenderness or nipple discharge and no palpable axillary masses or adenopathy  CV: regular rate and rhythm, normal S1 S2, no S3 or S4, no murmur, click or rub, no peripheral edema and peripheral pulses strong  ABDOMEN: soft, nontender, no hepatosplenomegaly, no masses and bowel sounds normal   (female): normal female external genitalia, normal urethral meatus, vaginal mucosa pink, moist, well rugated, and normal cervix/adnexa/uterus without masses or discharge  MS: no gross musculoskeletal defects noted, no edema  SKIN: no suspicious lesions or rashes  NEURO: Normal strength and tone, mentation intact and speech normal  PSYCH: mentation appears normal, affect normal/bright    Diagnostic Test Results:  Labs reviewed in Epic    ASSESSMENT/PLAN:   Patricia was seen today for physical.    Diagnoses and all orders for this visit:    Routine general medical examination at a health care facility  Well woman exam with breast exam and Pap smear completed today.    Labs today.    Will notify of lab results.  -     Basic metabolic panel  (Ca, Cl, CO2, Creat, Gluc, K, Na, BUN)    Birth control counseling  Desires IUD.  Referral to GYN.  -     OB/GYN " "REFERRAL    Need for hepatitis C screening test  -     Hepatitis C Screen Reflex to HCV RNA Quant and Genotype    Screening for malignant neoplasm of cervix  -     Pap imaged thin layer screen reflex to HPV if ASCUS - recommend age 25 - 29    Screening for thyroid disorder  -     TSH with free T4 reflex    Screening for deficiency anemia  -     CBC with platelets    Other orders  -     INFLUENZA VACCINE IM > 6 MONTHS VALENT IIV4 [06864]  -     REVIEW OF HEALTH MAINTENANCE PROTOCOL ORDERS  -     TDAP VACCINE (Adacel, Boostrix)  [[id number]]    Patient has been advised of split billing requirements and indicates understanding: Yes  COUNSELING:  Reviewed preventive health counseling, as reflected in patient instructions       Regular exercise       Healthy diet/nutrition    Estimated body mass index is 29.97 kg/m  as calculated from the following:    Height as of this encounter: 1.74 m (5' 8.5\").    Weight as of this encounter: 90.7 kg (200 lb).    Weight management plan: Discussed healthy diet and exercise guidelines    She reports that she has never smoked. She has never used smokeless tobacco.      Counseling Resources:  ATP IV Guidelines  Pooled Cohorts Equation Calculator  Breast Cancer Risk Calculator  BRCA-Related Cancer Risk Assessment: FHS-7 Tool  FRAX Risk Assessment  ICSI Preventive Guidelines  Dietary Guidelines for Americans, 2010  USDA's MyPlate  ASA Prophylaxis  Lung CA Screening    Lucille Sr, EDGAR-BCCNP  [facility]  "

## 2021-02-18 LAB
ANION GAP SERPL CALCULATED.3IONS-SCNC: 8 MMOL/L (ref 3–14)
BUN SERPL-MCNC: 15 MG/DL (ref 7–30)
CALCIUM SERPL-MCNC: 9.4 MG/DL (ref 8.5–10.1)
CHLORIDE SERPL-SCNC: 106 MMOL/L (ref 94–109)
CO2 SERPL-SCNC: 24 MMOL/L (ref 20–32)
CREAT SERPL-MCNC: 0.81 MG/DL (ref 0.52–1.04)
GFR SERPL CREATININE-BSD FRML MDRD: >90 ML/MIN/{1.73_M2}
GLUCOSE SERPL-MCNC: 73 MG/DL (ref 70–99)
HCV AB SERPL QL IA: NONREACTIVE
POTASSIUM SERPL-SCNC: 3.9 MMOL/L (ref 3.4–5.3)
SODIUM SERPL-SCNC: 138 MMOL/L (ref 133–144)
TSH SERPL DL<=0.005 MIU/L-ACNC: 1.94 MU/L (ref 0.4–4)

## 2021-02-19 ENCOUNTER — OFFICE VISIT (OUTPATIENT)
Dept: FAMILY MEDICINE | Facility: CLINIC | Age: 26
End: 2021-02-19
Attending: NURSE PRACTITIONER
Payer: COMMERCIAL

## 2021-02-19 VITALS
SYSTOLIC BLOOD PRESSURE: 108 MMHG | WEIGHT: 200 LBS | HEART RATE: 80 BPM | TEMPERATURE: 99.2 F | BODY MASS INDEX: 29.62 KG/M2 | RESPIRATION RATE: 14 BRPM | HEIGHT: 69 IN | DIASTOLIC BLOOD PRESSURE: 78 MMHG

## 2021-02-19 DIAGNOSIS — Z30.9 ENCOUNTER FOR CONTRACEPTIVE MANAGEMENT, UNSPECIFIED TYPE: Primary | ICD-10-CM

## 2021-02-19 PROCEDURE — 99213 OFFICE O/P EST LOW 20 MIN: CPT | Performed by: FAMILY MEDICINE

## 2021-02-19 ASSESSMENT — ENCOUNTER SYMPTOMS
FATIGUE: 0
FACIAL SWELLING: 0
FEVER: 0

## 2021-02-19 ASSESSMENT — MIFFLIN-ST. JEOR: SCORE: 1698.63

## 2021-02-19 NOTE — RESULT ENCOUNTER NOTE
Dear Patricia,    It was great to meet you.     Here is a summary of your recent test results:    -All of your labs are normal. Your pap is still in process. Our pap team will reach out to you with those results.     For additional lab test information, [email or web address] is an excellent reference.    In addition, here is a list of due or overdue Health Maintenance reminders:    There are no preventive care reminders to display for this patient.    Please call us at [phone] (or use [employer]) to address the above recommendations if needed.    Thank you for choosing  [facility].  It was an honor and a privilege to participate in your care.       Healthy regards,    Lucille Sr, EDGAR  [facility]

## 2021-02-19 NOTE — PROGRESS NOTES
"    Assessment & Plan     Encounter for contraceptive management, unspecified type    - OB/GYN REFERRAL; Future    - we discussed in detail different contraceptive options .  Patient would like IUD .  Consult is placed for ob gyn for IUD placement .      Return in about 1 year (around 2/19/2022) for Routine preventive.    Hanane Bhakta MD  [facility] CONSTANCE Gomez is a 25 year old who presents for the following health issues     History of Present Illness       She eats 2-3 servings of fruits and vegetables daily.She consumes 0 sweetened beverage(s) daily.She exercises with enough effort to increase her heart rate 30 to 60 minutes per day.  She exercises with enough effort to increase her heart rate 4 days per week.   She is taking medications regularly.       Review of Systems   Constitutional: Negative for fatigue and fever.   HENT: Negative for facial swelling.             Objective    /78 (BP Location: Right arm, Patient Position: Chair, Cuff Size: Adult Large)   Pulse 80   Temp 99.2  F (37.3  C) (Oral)   Resp 14   Ht 1.74 m (5' 8.5\")   Wt 90.7 kg (200 lb)   LMP 02/09/2021 (Exact Date)   Breastfeeding No   BMI 29.97 kg/m    Body mass index is 29.97 kg/m .  Physical Exam   No physical exam performed .        "

## 2021-02-23 LAB
COPATH REPORT: NORMAL
PAP: NORMAL

## 2021-03-19 ENCOUNTER — OFFICE VISIT (OUTPATIENT)
Dept: OBGYN | Facility: CLINIC | Age: 26
End: 2021-03-19
Payer: COMMERCIAL

## 2021-03-19 VITALS — DIASTOLIC BLOOD PRESSURE: 76 MMHG | WEIGHT: 201 LBS | BODY MASS INDEX: 30.12 KG/M2 | SYSTOLIC BLOOD PRESSURE: 118 MMHG

## 2021-03-19 DIAGNOSIS — Z30.09 ENCOUNTER FOR OTHER GENERAL COUNSELING AND ADVICE ON CONTRACEPTION: Primary | ICD-10-CM

## 2021-03-19 LAB — HCG, QUAL URINE: NEGATIVE

## 2021-03-19 PROCEDURE — 84703 CHORIONIC GONADOTROPIN ASSAY: CPT | Performed by: ADVANCED PRACTICE MIDWIFE

## 2021-03-19 PROCEDURE — 99202 OFFICE O/P NEW SF 15 MIN: CPT | Performed by: ADVANCED PRACTICE MIDWIFE

## 2021-03-19 PROCEDURE — 87491 CHLMYD TRACH DNA AMP PROBE: CPT | Performed by: ADVANCED PRACTICE MIDWIFE

## 2021-03-19 PROCEDURE — 87591 N.GONORRHOEAE DNA AMP PROB: CPT | Performed by: ADVANCED PRACTICE MIDWIFE

## 2021-03-19 RX ORDER — MISOPROSTOL 100 UG/1
TABLET ORAL
Qty: 2 TABLET | Refills: 0 | Status: SHIPPED | OUTPATIENT
Start: 2021-03-19 | End: 2022-02-11

## 2021-03-19 RX ORDER — IBUPROFEN 800 MG/1
800 TABLET, FILM COATED ORAL EVERY 8 HOURS PRN
Qty: 16 TABLET | Refills: 1 | Status: SHIPPED | OUTPATIENT
Start: 2021-03-19 | End: 2022-02-11

## 2021-03-19 NOTE — PROGRESS NOTES
SUBJECTIVE:                                                   Patricia Heredia is a 25 year old who presents to clinic today for the following health issue(s):  Patient presents with:  Contraception: Mirena or Kyleena IUD      HPI:  Patient initially wanted IUD placed today, but unable to sound uterus. Discussed option of patient returning with next menses and to premedicate with cytotedc    Patient's last menstrual period was 2021 (exact date).  Menstrual History: frequency: every 28 days  Patient is sexually active  .  Using condoms for contraception.   STI infx testing offered:  Accepted    Last PHQ-9 score on record =   PHQ-9 SCORE 2018   PHQ-9 Total Score 0     Last GAD7 score on record =   VIOLETTA-7 SCORE 2018   Total Score 0         Problem list and histories reviewed & adjusted, as indicated.  Additional history: as documented.    Patient Active Problem List   Diagnosis     Contraceptive management     Back injury, sequela     Achilles tendinitis of right lower extremity     Past Surgical History:   Procedure Laterality Date     ADENOIDECTOMY Bilateral     in first grade - secondary to recurrent otitis media - went away after that     wisdom teeth        Social History     Tobacco Use     Smoking status: Never Smoker     Smokeless tobacco: Never Used   Substance Use Topics     Alcohol use: Yes     Frequency: 2-4 times a month     Drinks per session: 3 or 4     Binge frequency: Never     Comment: 0-5 per week.       Problem (# of Occurrences) Relation (Name,Age of Onset)    Diabetes (1) Mother (Fabi Durand): type 2     Hyperlipidemia (1) Mother (Fabi Durand)    Hypertension (1) Father    Parkinsonism (1) Maternal Grandfather            Current Outpatient Medications   Medication Sig     ibuprofen (ADVIL/MOTRIN) 800 MG tablet Take 1 tablet (800 mg) by mouth every 8 hours as needed for moderate pain     misoprostol (CYTOTEC) 100 MCG tablet Insert both tablets into vagina the night  before your IUD insertion procedure.     Current Facility-Administered Medications   Medication     levonorgestrel (MIRENA) 20 MCG/24HR IUD 20 mcg     Allergies   Allergen Reactions     Seasonal Allergies        ROS:  CONSTITUTIONAL: NEGATIVE for fever, chills, change in weight  GI: NEGATIVE for nausea, abdominal pain, heartburn, or change in bowel habits  : NEGATIVE for unusual urinary or vaginal symptoms. Periods are regular.  NEURO: NEGATIVE for weakness, dizziness or paresthesias  HEME/ALLERGY/IMMUNE: NEGATIVE for bleeding problems  PSYCHIATRIC: NEGATIVE for changes in mood or affect    OBJECTIVE:     /76   Wt 91.2 kg (201 lb)   LMP 03/07/2021 (Exact Date)   Breastfeeding No   BMI 30.12 kg/m    Body mass index is 30.12 kg/m .    PHYSICAL EXAM:  Constitutional:  Appearance: Well nourished, well developed alert, in no acute distress  Chest:  Respiratory Effort:  Breathing unlabored.  Neurologic:  Mental Status:  Oriented X3.  Normal strength and tone, sensory exam grossly normal, mentation intact and speech normal.    Psychiatric:  Mentation appears normal and affect normal/bright.     Pelvic Exam:  Vulva: No external lesions, normal hair distribution, no adenopathy  Vagina: Moist, pink, no abnormal discharge, well rugated, no lesions, swab collected for G/Chlam  Cervix:  smooth, pink, no visible lesions, unable to pass sound through cervix  Uterus: Normal size, anteverted, non-tender, mobile  Ovaries: No mass, non-tender, mobile  Rectal exam: Deferred    In-Clinic Test Results:  Results for orders placed or performed in visit on 03/19/21 (from the past 24 hour(s))   HCL HCG, URINE, NURSE BACKOFFICE   Result Value Ref Range    hCG, Qual Urine Negative        ASSESSMENT/PLAN:                                                        ICD-10-CM    1. Encounter for other general counseling and advice on contraception  Z30.09 misoprostol (CYTOTEC) 100 MCG tablet     ibuprofen (ADVIL/MOTRIN) 800 MG tablet        PLAN:    Patient to return with next menses. Will abstain from intercourse until then.    Reviewed instructions for use of Mirena IUD. Discussed insertion procedure, risk/benefit profile, common side effects and more serious potential complications including infection and uterine perforation. Instructed to insert cytotec intravaginally the night before the insertion, and to take ibuprofen one hour prior to procedure. Advised patient to expect some bleeding and cramping immediately after the insertion and potentially irregular cramping and bleeding for up to three months following insertion. Given written and verbal information. All questions answered. Patient wishes to move forward and schedule IUD insertion.      ROSIO Dunlap, CNM

## 2021-03-19 NOTE — NURSING NOTE
"Chief Complaint   Patient presents with     Contraception     Mirena or Kyleena IUD       Initial /76   Wt 91.2 kg (201 lb)   LMP 2021 (Exact Date)   Breastfeeding No   BMI 30.12 kg/m   Estimated body mass index is 30.12 kg/m  as calculated from the following:    Height as of 21: 1.74 m (5' 8.5\").    Weight as of this encounter: 91.2 kg (201 lb).  BP completed using cuff size: regular    Questioned patient about current smoking habits.  Pt. has never smoked.          Keyur Bernal MA             "

## 2021-03-19 NOTE — PATIENT INSTRUCTIONS
Reviewed instructions for use of Mirena IUD. Discussed insertion procedure, risk/benefit profile, common side effects and more serious potential complications including infection and uterine perforation. Instructed to insert cytotec intravaginally the night before the insertion, and to take ibuprofen one hour prior to procedure. Advised patient to expect some bleeding and cramping immediately after the insertion and potentially irregular cramping and bleeding for up to three months following insertion. Given written and verbal information. All questions answered. Patient wishes to move forward and schedule IUD insertion.      Your Intrauterine Device (IUD)     What to watch for right after IUD placement:      Some women may experience uterine cramps, bleeding, and/or dizziness during and right after IUD placement       To help minimize the cramps, you may take ibuprofen 800 mg with food prior to your appointment. These symptoms should improve over the next 24 hours.  Mild cramping may be present for a few days after IUD placement. Please continue taking the ibuprofen 800 mg with food three times a day for the next five days.      You may experience spotting or bleeding for the first few weeks after IUD placement      Use condoms or abstain from sex for 7 days after the insertion of your Mirena or Angelique IUD      If you experience fever, abdominal pain, worsening pelvic pain, dizziness, unusually heavy vaginal bleeding, suspected expulsion of device or four smelling vaginal discharge please come to the clinic for evaluation      Please schedule an appointment at the clinic for a string check 4-6 weeks after IUD placement    Your periods may change (Angelique/Mirena):      For the first 3 to 6 months, your monthly period may become irregular. You may also have frequent spotting or light bleeding. A few women have heavy bleeding during this time. After your body adjusts, the number of bleeding days is likely to decrease  (but may remain irregular), and you may even find that your periods stop altogether for as long as Angelique/Mirena is in place.  Your periods will return rapidly once the IUD is removed.      ParaGard IUD users:      ParaGard IUD users may experience heavier than normal cycles while their IUD is in place, this is considered normal     Back-up contraception is not needed                Checking for your strings:      We encourage everyone with an IUD in place to check for their strings monthly      You may check your own IUD strings by inserting a finger into the vagina and feeling the strings as they exit the cervix.  The strings will initially feel firm, like fishing line, but will soften over a few weeks.  After the strings have softened, you or your partner should not be able to feel the strings during intercourse.       If the string length greatly changes or if you cannot feel your strings at all please make an appointment to see you midwife and use a backup method of contraception like a condom.      If you can feel something hard/plastic like the IUD may not be in the correct place. You should then see your healthcare provider to have the position confirmed with ultrasound.       Remember:    IUD's do not protect against HIV or STIs.  IUD's do not prevent the formation of ovarian cysts.  IUD's do not typically reduce acne or cause weight gain or mood changes.    For more information:  [email or web address]      If you have questions or concerns please call:    Benjamin Guzmán  [phone]

## 2021-03-20 LAB
C TRACH DNA SPEC QL NAA+PROBE: NEGATIVE
N GONORRHOEA DNA SPEC QL NAA+PROBE: NEGATIVE
SPECIMEN SOURCE: NORMAL
SPECIMEN SOURCE: NORMAL

## 2021-05-03 ENCOUNTER — OFFICE VISIT (OUTPATIENT)
Dept: OBGYN | Facility: CLINIC | Age: 26
End: 2021-05-03
Payer: COMMERCIAL

## 2021-05-03 VITALS — BODY MASS INDEX: 29.67 KG/M2 | WEIGHT: 198 LBS | SYSTOLIC BLOOD PRESSURE: 110 MMHG | DIASTOLIC BLOOD PRESSURE: 68 MMHG

## 2021-05-03 DIAGNOSIS — Z11.3 ROUTINE SCREENING FOR STI (SEXUALLY TRANSMITTED INFECTION): Primary | ICD-10-CM

## 2021-05-03 DIAGNOSIS — Z30.430 ENCOUNTER FOR INSERTION OF INTRAUTERINE CONTRACEPTIVE DEVICE: ICD-10-CM

## 2021-05-03 DIAGNOSIS — Z30.430 ENCOUNTER FOR INSERTION OF MIRENA IUD: ICD-10-CM

## 2021-05-03 PROBLEM — M54.2 NECK PAIN: Status: ACTIVE | Noted: 2018-02-23

## 2021-05-03 PROBLEM — M54.9 BACK PAIN, UNSPECIFIED BACK LOCATION, UNSPECIFIED BACK PAIN LATERALITY, UNSPECIFIED CHRONICITY: Status: ACTIVE | Noted: 2018-02-23

## 2021-05-03 PROCEDURE — 87491 CHLMYD TRACH DNA AMP PROBE: CPT | Performed by: ADVANCED PRACTICE MIDWIFE

## 2021-05-03 PROCEDURE — 58300 INSERT INTRAUTERINE DEVICE: CPT | Performed by: ADVANCED PRACTICE MIDWIFE

## 2021-05-03 PROCEDURE — 87591 N.GONORRHOEAE DNA AMP PROB: CPT | Performed by: ADVANCED PRACTICE MIDWIFE

## 2021-05-03 NOTE — PATIENT INSTRUCTIONS
Your Intrauterine Device (IUD)     What to watch for right after IUD placement:      Some women may experience uterine cramps, bleeding, and/or dizziness during and right after IUD placement       To help minimize the cramps, you may take ibuprofen 800 mg with food prior to your appointment. These symptoms should improve over the next 24 hours.  Mild cramping may be present for a few days after IUD placement. Please continue taking the ibuprofen 800 mg with food three times a day for the next five days.      You may experience spotting or bleeding for the first few weeks after IUD placement      Use condoms or abstain from sex for 7 days after the insertion of your Mirena or Angelique IUD      If you experience fever, abdominal pain, worsening pelvic pain, dizziness, unusually heavy vaginal bleeding, suspected expulsion of device or four smelling vaginal discharge please come to the clinic for evaluation      Please schedule an appointment at the clinic for a string check 4-6 weeks after IUD placement    Your periods may change (Angelique/Mirena):      For the first 3 to 6 months, your monthly period may become irregular. You may also have frequent spotting or light bleeding. A few women have heavy bleeding during this time. After your body adjusts, the number of bleeding days is likely to decrease (but may remain irregular), and you may even find that your periods stop altogether for as long as Angelique/Mirena is in place.  Your periods will return rapidly once the IUD is removed.      ParaGard IUD users:      ParaGard IUD users may experience heavier than normal cycles while their IUD is in place, this is considered normal     Back-up contraception is not needed                Checking for your strings:      We encourage everyone with an IUD in place to check for their strings monthly      You may check your own IUD strings by inserting a finger into the vagina and feeling the strings as they exit the cervix.  The strings  will initially feel firm, like fishing line, but will soften over a few weeks.  After the strings have softened, you or your partner should not be able to feel the strings during intercourse.       If the string length greatly changes or if you cannot feel your strings at all please make an appointment to see you midwife and use a backup method of contraception like a condom.      If you can feel something hard/plastic like the IUD may not be in the correct place. You should then see your healthcare provider to have the position confirmed with ultrasound.       Remember:    IUD's do not protect against HIV or STIs.  IUD's do not prevent the formation of ovarian cysts.  IUD's do not typically reduce acne or cause weight gain or mood changes.    For more information:  [email or web address]      If you have questions or concerns please call:    Benjamin Guzmán  [phone]

## 2021-05-03 NOTE — PROGRESS NOTES
MIDWIFE IUD PLACEMENT NOTE    IUD type: Mirena  Lot #: [id number]  NDC#: 54263-917-22      HPI:   Patricia Heredia is a 25 year old female here today for IUD insertion.  Patient's last menstrual period was 04/29/2021..  Today's pregnancy test - Negative at last visit. No sexual contact since last visit  Patient has premedicated with Ibuprofen 800 mgs  Patient did use Cytotec prior to IUD insertion  STD testing offered? accepted  Patient does not have any infections or cervicitis  Patient does not have history of liver problems or cancer.     Patient has been given written information.  I have reviewed the risks of the IUD including pregnancy, PID, life threatening infection, perforation, expulsion, cramping, changes in bleeding and ovarian cysts. Benefits of the IUD and alternative family planning methods have been discussed.  The probable mechanisms of action were covered, failure rates, spontaneous expulsion, the importance of checking the string monthly, as well as minor or nuisance side effects such as ovarian cysts, migraines, skin changes irregular and unpredictable bleeding in the first 6 months of use and bleeding patterns after the first 6 months.  The 's printed material was provided for her review.  Patients questions are answered.  Patient has verbalized understanding of risks and benefits and has signed the consent form.      Health maintenance updated:  yes    Allergies   Allergen Reactions     Seasonal Allergies      Current Outpatient Medications   Medication Sig Dispense Refill     ibuprofen (ADVIL/MOTRIN) 800 MG tablet Take 1 tablet (800 mg) by mouth every 8 hours as needed for moderate pain 16 tablet 1     levonorgestrel (MIRENA) 20 MCG/24HR IUD 1 each (20 mcg) by Intrauterine route once       misoprostol (CYTOTEC) 100 MCG tablet Insert both tablets into vagina the night before your IUD insertion procedure. 2 tablet 0      Past Medical History:   Diagnosis Date     Back injury, sequela      from diving in volleyball during HS - better with 3x/week stretching with boxing      Contraceptive management     on tri-estaryla - got bad mood swings/anger issues.      Family History   Problem Relation Age of Onset     Diabetes Mother         type 2      Hyperlipidemia Mother      Hypertension Father      Parkinsonism Maternal Grandfather      Social History     Socioeconomic History     Marital status: Single     Spouse name: Daniel Crump     Number of children: 0     Years of education: 16     Highest education level: Bachelor's degree (e.g., BA, AB, BS)   Occupational History     Occupation: Medical Software      Comment: GI procedure documentation    Social Needs     Financial resource strain: Not on file     Food insecurity     Worry: Not on file     Inability: Not on file     Transportation needs     Medical: Not on file     Non-medical: Not on file   Tobacco Use     Smoking status: Never Smoker     Smokeless tobacco: Never Used   Substance and Sexual Activity     Alcohol use: Yes     Frequency: 2-4 times a month     Drinks per session: 3 or 4     Binge frequency: Never     Comment: 0-5 per week.      Drug use: No     Sexual activity: Yes     Partners: Male     Birth control/protection: Condom     Comment: i'd like to discuss birth control options, but not the pill   Lifestyle     Physical activity     Days per week: 5 days     Minutes per session: 30 min     Stress: Not on file   Relationships     Social connections     Talks on phone: Not on file     Gets together: Not on file     Attends Jehovah's witness service: Not on file     Active member of club or organization: Not on file     Attends meetings of clubs or organizations: Not on file     Relationship status: Not on file     Intimate partner violence     Fear of current or ex partner: Not on file     Emotionally abused: Not on file     Physically abused: Not on file     Forced sexual activity: Not on file   Other Topics Concern     Parent/sibling w/ CABG,  MI or angioplasty before 65F 55M? No   Social History Narrative     Not on file     Past Surgical History:   Procedure Laterality Date     ADENOIDECTOMY Bilateral     in first grade - secondary to recurrent otitis media - went away after that     wisdom teeth         REVIEW OF SYSTEMS:  CONSTITUTIONAL: NEGATIVE for fever, chills, change in weight  GI: NEGATIVE for nausea, abdominal pain, heartburn, or change in bowel habits  : NEGATIVE for unusual urinary or vaginal symptoms. Periods are regular.  NEURO: NEGATIVE for weakness, dizziness or paresthesias  HEME/ALLERGY/IMMUNE: NEGATIVE for bleeding problems  PSYCHIATRIC: NEGATIVE for changes in mood or affect    EXAM:  /68 (BP Location: Left arm, Cuff Size: Adult Regular)   Wt 89.8 kg (198 lb)   LMP 04/29/2021   BMI 29.67 kg/m      Exam:  Constitutional: healthy, alert and no distress  Respiratory: negative, Percussion normal. Good diaphragmatic excursion.   Gastrointestinal: Abdomen soft, non-tender.  No masses, organomegaly  Psychiatric: mentation appears normal and affect normal/bright    PELVIC EXAM:  Vulva: No external lesions, BUS WNL  Vagina: Moist, pink, discharge normal  well rugated, no lesions, swab collected for GC/Chlam  Cervix:, smooth, pink, no visible lesions, neg CMT  Uterus: Normal size, anteverted, non-tender, mobile  Ovaries: No mass, non-tender  Rectal exam: deferred      ASSESSMENT/ PLAN:      ICD-10-CM    1. Routine screening for STI (sexually transmitted infection)  Z11.3 NEISSERIA GONORRHOEA PCR     CHLAMYDIA TRACHOMATIS PCR   2. Encounter for insertion of intrauterine contraceptive device  Z30.430 levonorgestrel (MIRENA) 20 MCG/24HR IUD     levonorgestrel (MIRENA) 20 MCG/24HR IUD 20 mcg     INSERTION INTRAUTERINE DEVICE         Procedure:  Uterus assessed for position and is anteverted.  Sterile speculum inserted.  Betadine prep of cervix done.  Tenaculum applied at 10 and 2 o'clock.  Uterine sounded to 7 cm's.  Cervical dilators  not used.   IUD inserted in the usual fashion without difficulty.  Tenaculum removed with scant bleeding from the cervix.  Strings trimmed to 3.5 cm's.  Patient tolerated the procedure well      COUNSELING:  Verbal and written instructions given to patient regarding checking IUD strings.    Reviewed warning signs of fever, sharp/severe abdominal pain, reassured it can be normal to have menstrual like cramping after placement and spotting/light bleeding may last a few weeks after placement.    Reviewed with patient that the IUD needs to be replaced in 4 years, nothing in vagina for 2 days following placement of Mirena or Angelique IUD's.  Use b/u method for one week following IUD placement.    Follow up appointment in 4 weeks if unable to feel IUD strings    ROSIO Dunlap, CNM

## 2021-05-03 NOTE — NURSING NOTE
"Chief Complaint   Patient presents with     Contraception     Mirena IUD insertion       Initial /68 (BP Location: Left arm, Cuff Size: Adult Regular)   Wt 89.8 kg (198 lb)   LMP 2021   BMI 29.67 kg/m   Estimated body mass index is 29.67 kg/m  as calculated from the following:    Height as of 21: 1.74 m (5' 8.5\").    Weight as of this encounter: 89.8 kg (198 lb).  BP completed using cuff size: regular    Questioned patient about current smoking habits.  Pt. has never smoked.          Keyur Bernal MA               "

## 2021-10-01 ENCOUNTER — MYC MEDICAL ADVICE (OUTPATIENT)
Dept: FAMILY MEDICINE | Facility: CLINIC | Age: 26
End: 2021-10-01

## 2021-10-04 NOTE — TELEPHONE ENCOUNTER
Please help update     Thank you     Edel Manuel RN, BSN  [facility]

## 2021-10-10 ENCOUNTER — HEALTH MAINTENANCE LETTER (OUTPATIENT)
Age: 26
End: 2021-10-10

## 2022-01-03 ENCOUNTER — IMMUNIZATION (OUTPATIENT)
Dept: NURSING | Facility: CLINIC | Age: 27
End: 2022-01-03
Payer: COMMERCIAL

## 2022-01-03 PROCEDURE — 0004A PR COVID VAC PFIZER DIL RECON 30 MCG/0.3 ML IM: CPT

## 2022-01-03 PROCEDURE — 91300 PR COVID VAC PFIZER DIL RECON 30 MCG/0.3 ML IM: CPT

## 2022-02-11 ENCOUNTER — OFFICE VISIT (OUTPATIENT)
Dept: FAMILY MEDICINE | Facility: CLINIC | Age: 27
End: 2022-02-11
Payer: COMMERCIAL

## 2022-02-11 VITALS
TEMPERATURE: 97.6 F | SYSTOLIC BLOOD PRESSURE: 116 MMHG | HEART RATE: 88 BPM | WEIGHT: 200 LBS | OXYGEN SATURATION: 97 % | HEIGHT: 69 IN | DIASTOLIC BLOOD PRESSURE: 68 MMHG | BODY MASS INDEX: 29.62 KG/M2

## 2022-02-11 DIAGNOSIS — N64.89 NIPPLE CRUSTING: ICD-10-CM

## 2022-02-11 DIAGNOSIS — Z00.00 ROUTINE GENERAL MEDICAL EXAMINATION AT A HEALTH CARE FACILITY: Primary | ICD-10-CM

## 2022-02-11 DIAGNOSIS — Z13.220 SCREENING FOR LIPID DISORDERS: ICD-10-CM

## 2022-02-11 DIAGNOSIS — Z30.430 ENCOUNTER FOR INSERTION OF MIRENA IUD: ICD-10-CM

## 2022-02-11 LAB
HCG SERPL QL: NEGATIVE
PROLACTIN SERPL-MCNC: 6 UG/L (ref 3–27)

## 2022-02-11 PROCEDURE — 90686 IIV4 VACC NO PRSV 0.5 ML IM: CPT | Performed by: NURSE PRACTITIONER

## 2022-02-11 PROCEDURE — 99213 OFFICE O/P EST LOW 20 MIN: CPT | Mod: 25 | Performed by: NURSE PRACTITIONER

## 2022-02-11 PROCEDURE — 84703 CHORIONIC GONADOTROPIN ASSAY: CPT | Performed by: NURSE PRACTITIONER

## 2022-02-11 PROCEDURE — 36415 COLL VENOUS BLD VENIPUNCTURE: CPT | Performed by: NURSE PRACTITIONER

## 2022-02-11 PROCEDURE — 80061 LIPID PANEL: CPT | Performed by: NURSE PRACTITIONER

## 2022-02-11 PROCEDURE — 90471 IMMUNIZATION ADMIN: CPT | Performed by: NURSE PRACTITIONER

## 2022-02-11 PROCEDURE — 84146 ASSAY OF PROLACTIN: CPT | Performed by: NURSE PRACTITIONER

## 2022-02-11 PROCEDURE — 84443 ASSAY THYROID STIM HORMONE: CPT | Performed by: NURSE PRACTITIONER

## 2022-02-11 PROCEDURE — 99395 PREV VISIT EST AGE 18-39: CPT | Mod: 25 | Performed by: NURSE PRACTITIONER

## 2022-02-11 RX ORDER — MUPIROCIN 20 MG/G
OINTMENT TOPICAL 3 TIMES DAILY
Qty: 15 G | Refills: 0 | Status: SHIPPED | OUTPATIENT
Start: 2022-02-11 | End: 2022-02-21

## 2022-02-11 RX ORDER — NYSTATIN 100000 U/G
CREAM TOPICAL 2 TIMES DAILY
Qty: 15 G | Refills: 1 | Status: SHIPPED | OUTPATIENT
Start: 2022-02-11 | End: 2022-02-21

## 2022-02-11 ASSESSMENT — ENCOUNTER SYMPTOMS
HEMATURIA: 0
HEARTBURN: 0
PALPITATIONS: 0
CONSTIPATION: 0
ARTHRALGIAS: 0
BREAST MASS: 0
SORE THROAT: 0
FEVER: 0
COUGH: 0
MYALGIAS: 0
HEMATOCHEZIA: 0
EYE PAIN: 0
DYSURIA: 0
HEADACHES: 0
NERVOUS/ANXIOUS: 0
FREQUENCY: 0
JOINT SWELLING: 0
PARESTHESIAS: 0
SHORTNESS OF BREATH: 0
DIZZINESS: 0
CHILLS: 0
ABDOMINAL PAIN: 0
WEAKNESS: 0
DIARRHEA: 0
NAUSEA: 0

## 2022-02-11 ASSESSMENT — MIFFLIN-ST. JEOR: SCORE: 1693.63

## 2022-02-11 NOTE — PROGRESS NOTES
SUBJECTIVE:   CC: Patricia Crump is an 26 year old woman who presents for preventive health visit.     Patient has been advised of split billing requirements and indicates understanding: Yes  Healthy Habits:     Getting at least 3 servings of Calcium per day:  Yes    Bi-annual eye exam:  Yes    Dental care twice a year:  Yes    Sleep apnea or symptoms of sleep apnea:  None    Diet:  Regular (no restrictions)    Frequency of exercise:  4-5 days/week    Duration of exercise:  45-60 minutes    Taking medications regularly:  Yes    Medication side effects:  None    PHQ-2 Total Score: 0    Additional concerns today:  No    No concerns wants lipid checked since fasting otherwise no labs today.     Wt Readings from Last 5 Encounters:   02/11/22 90.7 kg (200 lb)   05/03/21 89.8 kg (198 lb)   03/19/21 91.2 kg (201 lb)   02/19/21 90.7 kg (200 lb)   02/17/21 90.7 kg (200 lb)     Today's PHQ-2 Score:   PHQ-2 ( 1999 Pfizer) 2/11/2022   Q1: Little interest or pleasure in doing things 0   Q2: Feeling down, depressed or hopeless 0   PHQ-2 Score 0   PHQ-2 Total Score (12-17 Years)- Positive if 3 or more points; Administer PHQ-A if positive -   Q1: Little interest or pleasure in doing things Not at all   Q2: Feeling down, depressed or hopeless Not at all   PHQ-2 Score 0     Abuse: Current or Past (Physical, Sexual or Emotional) - No  Do you feel safe in your environment? Yes    Social History     Tobacco Use     Smoking status: Never Smoker     Smokeless tobacco: Never Used   Substance Use Topics     Alcohol use: Yes     Comment: 0-5 per week.      If you drink alcohol do you typically have >3 drinks per day or >7 drinks per week? No    Alcohol Use 2/11/2022   Prescreen: >3 drinks/day or >7 drinks/week? No   Prescreen: >3 drinks/day or >7 drinks/week? -     Reviewed orders with patient.  Reviewed health maintenance and updated orders accordingly - Yes  Lab work is in process  Labs reviewed in EPIC  BP Readings from Last 3  Encounters:   02/11/22 116/68   05/03/21 110/68   03/19/21 118/76    Wt Readings from Last 3 Encounters:   02/11/22 90.7 kg (200 lb)   05/03/21 89.8 kg (198 lb)   03/19/21 91.2 kg (201 lb)          Patient Active Problem List   Diagnosis     Back injury, sequela     Achilles tendinitis of right lower extremity     Back pain, unspecified back location, unspecified back pain laterality, unspecified chronicity     BMI 27.0-27.9,adult     Neck pain     Mirena IUD inserted: 5/3/2021     Past Surgical History:   Procedure Laterality Date     ADENOIDECTOMY Bilateral     in first grade - secondary to recurrent otitis media - went away after that     wisdom teeth         Social History     Tobacco Use     Smoking status: Never Smoker     Smokeless tobacco: Never Used   Substance Use Topics     Alcohol use: Yes     Comment: 0-5 per week.      Family History   Problem Relation Age of Onset     Diabetes Mother         type 2      Hyperlipidemia Mother      Hypertension Father      Parkinsonism Maternal Grandfather      Breast Cancer Maternal Grandmother         two small lumps in one breast, invasive ductal carcinoma grade II, single mastectomy, did not spread to lymph nodes, hormonal therapy for treatment         Current Outpatient Medications   Medication Sig Dispense Refill     levonorgestrel (MIRENA) 20 MCG/24HR IUD 1 each by Intrauterine route once        mupirocin (BACTROBAN) 2 % external ointment Apply topically 3 times daily for 10 days 15 g 0     nystatin (MYCOSTATIN) 118121 UNIT/GM external cream Apply topically 2 times daily for 10 days 15 g 1     Allergies   Allergen Reactions     Seasonal Allergies        Breast Cancer Screening:  Any new diagnosis of family breast, ovarian, or bowel cancer? Yes maternal grandmother     Breast CA Risk Assessment (FHS-7) 2/14/2021   Do you have a family history of breast, colon, or ovarian cancer? No / Unknown       Patient under 40 years of age: Routine Mammogram Screening not  "recommended.   Pertinent mammograms are reviewed under the imaging tab.    History of abnormal Pap smear: NO - age 30- 65 PAP every 3 years recommended  PAP / HPV 2/17/2021   PAP (Historical) NIL     Reviewed and updated as needed this visit by clinical staff  Tobacco  Allergies  Meds  Problems  Med Hx  Surg Hx  Fam Hx  Soc Hx       Reviewed and updated as needed this visit by Provider  Tobacco  Allergies  Meds  Problems  Med Hx  Surg Hx  Fam Hx        Review of Systems   Constitutional: Negative for chills and fever.   HENT: Negative for congestion, ear pain, hearing loss and sore throat.    Eyes: Negative for pain and visual disturbance.   Respiratory: Negative for cough and shortness of breath.    Cardiovascular: Negative for chest pain, palpitations and peripheral edema.   Gastrointestinal: Negative for abdominal pain, constipation, diarrhea, heartburn, hematochezia and nausea.   Breasts:  Negative for tenderness, breast mass and discharge.   Genitourinary: Negative for dysuria, frequency, genital sores, hematuria, pelvic pain, urgency, vaginal bleeding and vaginal discharge.   Musculoskeletal: Negative for arthralgias, joint swelling and myalgias.   Skin: Negative for rash.   Neurological: Negative for dizziness, weakness, headaches and paresthesias.   Psychiatric/Behavioral: Negative for mood changes. The patient is not nervous/anxious.      OBJECTIVE:   /68 (BP Location: Right arm, Patient Position: Chair, Cuff Size: Adult Large)   Pulse 88   Temp 97.6  F (36.4  C) (Tympanic)   Ht 1.74 m (5' 8.5\")   Wt 90.7 kg (200 lb)   LMP 02/02/2022 (Exact Date)   SpO2 97%   Breastfeeding No   BMI 29.97 kg/m    Physical Exam  GENERAL: healthy, alert and no distress  EYES: Eyes grossly normal to inspection, PERRL and conjunctivae and sclerae normal  HENT: ear canals and TM's normal, nose and mouth without ulcers or lesions  NECK: no adenopathy, no asymmetry, masses, or scars and thyroid normal to " palpation  RESP: lungs clear to auscultation - no rales, rhonchi or wheezes  BREAST: Small amount of dried crusty green discharge bilateral breast with no other abnormalities appreciated.  Normal without masses, tenderness or nipple discharge and no palpable axillary masses or adenopathy  CV: regular rate and rhythm, normal S1 S2, no S3 or S4, no murmur, click or rub, no peripheral edema and peripheral pulses strong  ABDOMEN: soft, nontender, no hepatosplenomegaly, no masses and bowel sounds normal   (female): declines.   MS: no gross musculoskeletal defects noted, no edema  SKIN: no suspicious lesions or rashes  NEURO: Normal strength and tone, mentation intact and speech normal  PSYCH: mentation appears normal, affect normal/bright    Diagnostic Test Results:  Labs reviewed in Epic    ASSESSMENT/PLAN:   Patricia was seen today for physical.    Diagnoses and all orders for this visit:    Routine general medical examination at a health care facility  Well woman exam with breast exam completed today.    Fasting labs today.    Will notify of lab results.  -     REVIEW OF HEALTH MAINTENANCE PROTOCOL ORDERS    Mirena IUD inserted: 5/3/2021  Feels her strings denies concerns breast exam normal.    Screening for lipid disorders  -     Lipid panel reflex to direct LDL Fasting; Future  -     Lipid panel reflex to direct LDL Fasting    Nipple crusting  Small amount of crusted discharge from bilateral nipples without otherwise abnormal exam.  Labs.  Cleaning of the area described at home with normal soap and water.  Can use Bactroban and nystatin to the area.  If she notices any drainage symptoms persist or has any abnormalities in her breast tissue would pursue breast imaging.  Patricia verbalizes understanding of plan of care and is in agreement.   18114  -     TSH with free T4 reflex; Future  -     Prolactin; Future  -     mupirocin (BACTROBAN) 2 % external ointment; Apply topically 3 times daily for 10 days  -     nystatin  "(MYCOSTATIN) 544535 UNIT/GM external cream; Apply topically 2 times daily for 10 days  -     HCG qualitative, Blood ([id number]); Future  -     TSH with free T4 reflex  -     Prolactin  -     HCG qualitative, Blood ([id number])    Other orders  -     INFLUENZA VACCINE IM > 6 MONTHS VALENT IIV4 (AFLURIA/FLUZONE)      Patient has been advised of split billing requirements and indicates understanding: Yes    COUNSELING:  Reviewed preventive health counseling, as reflected in patient instructions       Regular exercise       Healthy diet/nutrition    Estimated body mass index is 29.97 kg/m  as calculated from the following:    Height as of this encounter: 1.74 m (5' 8.5\").    Weight as of this encounter: 90.7 kg (200 lb).    Weight management plan: Discussed healthy diet and exercise guidelines    She reports that she has never smoked. She has never used smokeless tobacco.    Counseling Resources:  ATP IV Guidelines  Pooled Cohorts Equation Calculator  Breast Cancer Risk Calculator  BRCA-Related Cancer Risk Assessment: FHS-7 Tool  FRAX Risk Assessment  ICSI Preventive Guidelines  Dietary Guidelines for Americans, 2010  USDA's MyPlate  ASA Prophylaxis  Lung CA Screening      Lucille Sr, ROSIO [facility]  "

## 2022-02-13 LAB
CHOLEST SERPL-MCNC: 161 MG/DL
FASTING STATUS PATIENT QL REPORTED: ABNORMAL
HDLC SERPL-MCNC: 49 MG/DL
LDLC SERPL CALC-MCNC: 100 MG/DL
NONHDLC SERPL-MCNC: 112 MG/DL
TRIGL SERPL-MCNC: 62 MG/DL
TSH SERPL DL<=0.005 MIU/L-ACNC: 1.5 MU/L (ref 0.4–4)

## 2022-02-15 NOTE — RESULT ENCOUNTER NOTE
"Dear Patricia,    Here is a summary of your recent test results:    All of your labs are normal except very slightly low \"good cholesterol\" HDL. Encourage increased physical activity and minimize processed foods to improve this.      For additional lab test information, [email or web address] is an excellent reference.    In addition, here is a list of due or overdue Health Maintenance reminders:    There are no preventive care reminders to display for this patient.    Please call us at [phone] (or use [employer]) to address the above recommendations if needed.    Thank you for choosing  [facility].  It was an honor and a privilege to participate in your care.       Healthy regards,    Lucille Sr, EDGAR   RNDOMNFormerly [facility]"

## 2022-09-18 ENCOUNTER — HEALTH MAINTENANCE LETTER (OUTPATIENT)
Age: 27
End: 2022-09-18

## 2022-11-07 ENCOUNTER — IMMUNIZATION (OUTPATIENT)
Dept: INTERNAL MEDICINE | Facility: CLINIC | Age: 27
End: 2022-11-07
Payer: COMMERCIAL

## 2022-11-07 PROCEDURE — 0134A COVID-19,PF,MODERNA BIVALENT: CPT

## 2022-11-07 PROCEDURE — 91313 COVID-19,PF,MODERNA BIVALENT: CPT

## 2023-05-07 ENCOUNTER — HEALTH MAINTENANCE LETTER (OUTPATIENT)
Age: 28
End: 2023-05-07

## 2024-07-14 ENCOUNTER — HEALTH MAINTENANCE LETTER (OUTPATIENT)
Age: 29
End: 2024-07-14

## 2025-07-19 ENCOUNTER — HEALTH MAINTENANCE LETTER (OUTPATIENT)
Age: 30
End: 2025-07-19